# Patient Record
Sex: MALE | Race: WHITE | NOT HISPANIC OR LATINO | Employment: OTHER | URBAN - METROPOLITAN AREA
[De-identification: names, ages, dates, MRNs, and addresses within clinical notes are randomized per-mention and may not be internally consistent; named-entity substitution may affect disease eponyms.]

---

## 2023-09-12 LAB
ALANINE AMINOTRANSFERASE (SGPT) (U/L) IN SER/PLAS: 31 U/L (ref 10–52)
ALBUMIN (G/DL) IN SER/PLAS: 4.4 G/DL (ref 3.4–5)
ALKALINE PHOSPHATASE (U/L) IN SER/PLAS: 56 U/L (ref 33–136)
ANION GAP IN SER/PLAS: 13 MMOL/L (ref 10–20)
ASPARTATE AMINOTRANSFERASE (SGOT) (U/L) IN SER/PLAS: 27 U/L (ref 9–39)
BILIRUBIN TOTAL (MG/DL) IN SER/PLAS: 1.3 MG/DL (ref 0–1.2)
CALCIUM (MG/DL) IN SER/PLAS: 9.6 MG/DL (ref 8.6–10.6)
CARBON DIOXIDE, TOTAL (MMOL/L) IN SER/PLAS: 28 MMOL/L (ref 21–32)
CHLORIDE (MMOL/L) IN SER/PLAS: 107 MMOL/L (ref 98–107)
CHOLESTEROL (MG/DL) IN SER/PLAS: 138 MG/DL (ref 0–199)
CHOLESTEROL IN HDL (MG/DL) IN SER/PLAS: 59.2 MG/DL
CHOLESTEROL/HDL RATIO: 2.3
CREATININE (MG/DL) IN SER/PLAS: 0.75 MG/DL (ref 0.5–1.3)
ERYTHROCYTE DISTRIBUTION WIDTH (RATIO) BY AUTOMATED COUNT: 12.7 % (ref 11.5–14.5)
ERYTHROCYTE MEAN CORPUSCULAR HEMOGLOBIN CONCENTRATION (G/DL) BY AUTOMATED: 33.1 G/DL (ref 32–36)
ERYTHROCYTE MEAN CORPUSCULAR VOLUME (FL) BY AUTOMATED COUNT: 99 FL (ref 80–100)
ERYTHROCYTES (10*6/UL) IN BLOOD BY AUTOMATED COUNT: 4.58 X10E12/L (ref 4.5–5.9)
GFR MALE: >90 ML/MIN/1.73M2
GLUCOSE (MG/DL) IN SER/PLAS: 97 MG/DL (ref 74–99)
HEMATOCRIT (%) IN BLOOD BY AUTOMATED COUNT: 45.3 % (ref 41–52)
HEMOGLOBIN (G/DL) IN BLOOD: 15 G/DL (ref 13.5–17.5)
LDL: 55 MG/DL (ref 0–99)
LEUKOCYTES (10*3/UL) IN BLOOD BY AUTOMATED COUNT: 5.4 X10E9/L (ref 4.4–11.3)
NRBC (PER 100 WBCS) BY AUTOMATED COUNT: 0 /100 WBC (ref 0–0)
PLATELETS (10*3/UL) IN BLOOD AUTOMATED COUNT: 146 X10E9/L (ref 150–450)
POTASSIUM (MMOL/L) IN SER/PLAS: 4.4 MMOL/L (ref 3.5–5.3)
PROSTATE SPECIFIC AG (NG/ML) IN SER/PLAS: 3.16 NG/ML (ref 0–4)
PROTEIN TOTAL: 6.5 G/DL (ref 6.4–8.2)
SODIUM (MMOL/L) IN SER/PLAS: 144 MMOL/L (ref 136–145)
TRIGLYCERIDE (MG/DL) IN SER/PLAS: 118 MG/DL (ref 0–149)
UREA NITROGEN (MG/DL) IN SER/PLAS: 11 MG/DL (ref 6–23)
VLDL: 24 MG/DL (ref 0–40)

## 2023-10-09 PROBLEM — I73.9 CLAUDICATION (CMS-HCC): Status: ACTIVE | Noted: 2023-10-09

## 2023-10-09 PROBLEM — I10 HYPERTENSION: Status: ACTIVE | Noted: 2023-10-09

## 2023-10-09 PROBLEM — R22.31 LUMP OF SKIN OF RIGHT UPPER EXTREMITY: Status: ACTIVE | Noted: 2023-10-09

## 2023-10-09 PROBLEM — K21.9 ESOPHAGEAL REFLUX: Status: ACTIVE | Noted: 2023-10-09

## 2023-10-09 PROBLEM — I73.9 PAD (PERIPHERAL ARTERY DISEASE) (CMS-HCC): Status: ACTIVE | Noted: 2023-10-09

## 2023-10-09 PROBLEM — R00.2 PALPITATIONS: Status: ACTIVE | Noted: 2023-10-09

## 2023-10-09 PROBLEM — N40.0 BPH WITHOUT OBSTRUCTION/LOWER URINARY TRACT SYMPTOMS: Status: ACTIVE | Noted: 2023-10-09

## 2023-10-09 PROBLEM — I25.10 CAD S/P PERCUTANEOUS CORONARY ANGIOPLASTY: Status: ACTIVE | Noted: 2023-10-09

## 2023-10-09 PROBLEM — N52.9 ED (ERECTILE DYSFUNCTION): Status: ACTIVE | Noted: 2023-10-09

## 2023-10-09 PROBLEM — Z98.61 CAD S/P PERCUTANEOUS CORONARY ANGIOPLASTY: Status: ACTIVE | Noted: 2023-10-09

## 2023-10-09 PROBLEM — M10.9 GOUT: Status: ACTIVE | Noted: 2023-10-09

## 2023-10-09 PROBLEM — E78.5 HYPERLIPIDEMIA: Status: ACTIVE | Noted: 2023-10-09

## 2023-10-09 PROBLEM — E66.9 OBESITY: Status: ACTIVE | Noted: 2023-10-09

## 2023-10-09 RX ORDER — NITROGLYCERIN 0.4 MG/1
TABLET SUBLINGUAL
COMMUNITY
Start: 2017-02-28

## 2023-10-09 RX ORDER — SILDENAFIL CITRATE 20 MG/1
TABLET ORAL
COMMUNITY
Start: 2017-09-26

## 2023-10-09 RX ORDER — LISINOPRIL AND HYDROCHLOROTHIAZIDE 12.5; 2 MG/1; MG/1
2 TABLET ORAL DAILY
COMMUNITY
Start: 2018-06-08

## 2023-10-09 RX ORDER — FLUOROURACIL 50 MG/G
CREAM TOPICAL 2 TIMES DAILY
COMMUNITY
Start: 2021-04-02

## 2023-10-09 RX ORDER — ATENOLOL 50 MG/1
1 TABLET ORAL DAILY
COMMUNITY
Start: 2016-02-28 | End: 2024-04-22

## 2023-10-09 RX ORDER — TAMSULOSIN HYDROCHLORIDE 0.4 MG/1
2 CAPSULE ORAL DAILY
COMMUNITY
Start: 2014-03-17 | End: 2024-03-01

## 2023-10-09 RX ORDER — ATORVASTATIN CALCIUM 80 MG/1
1 TABLET, FILM COATED ORAL DAILY
COMMUNITY
Start: 2016-05-31 | End: 2024-01-17

## 2023-10-09 RX ORDER — ASPIRIN 81 MG/1
1 TABLET ORAL DAILY
COMMUNITY
Start: 2017-02-28

## 2023-10-09 RX ORDER — CILOSTAZOL 100 MG/1
1 TABLET ORAL 2 TIMES DAILY
COMMUNITY
Start: 2017-09-26

## 2023-10-09 RX ORDER — OMEPRAZOLE 20 MG/1
CAPSULE, DELAYED RELEASE ORAL
COMMUNITY
Start: 2014-02-19 | End: 2024-03-22

## 2023-10-10 ENCOUNTER — OFFICE VISIT (OUTPATIENT)
Dept: SURGERY | Facility: CLINIC | Age: 78
End: 2023-10-10
Payer: MEDICARE

## 2023-10-10 VITALS
BODY MASS INDEX: 27.31 KG/M2 | HEIGHT: 67 IN | WEIGHT: 174 LBS | OXYGEN SATURATION: 97 % | SYSTOLIC BLOOD PRESSURE: 183 MMHG | HEART RATE: 74 BPM | TEMPERATURE: 97.5 F | DIASTOLIC BLOOD PRESSURE: 90 MMHG

## 2023-10-10 DIAGNOSIS — R19.5 CHANGE IN STOOL CALIBER: Primary | ICD-10-CM

## 2023-10-10 PROCEDURE — 1126F AMNT PAIN NOTED NONE PRSNT: CPT | Performed by: STUDENT IN AN ORGANIZED HEALTH CARE EDUCATION/TRAINING PROGRAM

## 2023-10-10 PROCEDURE — 99204 OFFICE O/P NEW MOD 45 MIN: CPT | Performed by: STUDENT IN AN ORGANIZED HEALTH CARE EDUCATION/TRAINING PROGRAM

## 2023-10-10 PROCEDURE — 99214 OFFICE O/P EST MOD 30 MIN: CPT | Performed by: STUDENT IN AN ORGANIZED HEALTH CARE EDUCATION/TRAINING PROGRAM

## 2023-10-10 PROCEDURE — 3077F SYST BP >= 140 MM HG: CPT | Performed by: STUDENT IN AN ORGANIZED HEALTH CARE EDUCATION/TRAINING PROGRAM

## 2023-10-10 PROCEDURE — 3080F DIAST BP >= 90 MM HG: CPT | Performed by: STUDENT IN AN ORGANIZED HEALTH CARE EDUCATION/TRAINING PROGRAM

## 2023-10-10 ASSESSMENT — PAIN SCALES - GENERAL: PAINLEVEL: 0-NO PAIN

## 2023-10-10 NOTE — PROGRESS NOTES
History Of Present Illness  Jose Melo Jr. is a 78 y.o. male who is presenting with bilateral inguinal hernias, and an umbilical hernia, and concerns about his bowel habits.  Over the past year, he has noticed that his bowel movements have become increasingly more difficult, requiring some straining, and have also been decreasing in caliber, now becoming quite thin.  He denies any hematochezia or melena, nausea, fevers, chills, weight loss.  He also has a longstanding history of known small bilateral inguinal hernias for approximately 10 years.  He also endorses a increasing in size umbilical hernia which is intermittently symptomatic, but does not interfere with his daily activities and does not bother him daily.  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 His smoking use included cigarettes. He does not have any smokeless tobacco history on file. He reports current alcohol use of about 2.0 standard drinks of alcohol per week. He reports that he does not use drugs.    Family History  Family History   Problem Relation Name Age of Onset    Kidney cancer Mother      Other (CABG) Father      Multiple myeloma Father      Diabetes Sister      Heart attack Other         "  Allergies  Penicillins    Review of Systems  - CONSTITUTIONAL: Denies fever and chills.  - HEENT: Denies changes in vision and hearing.  - RESPIRATORY: Denies SOB and cough.  - CV: Denies palpitations and CP.  - GI: Denies abdominal pain, nausea, vomiting and diarrhea.  - : Denies dysuria and urinary frequency.  - MSK: Denies myalgia and joint pain.  - SKIN: Denies rash and pruritus.  - NEUROLOGICAL: Denies headache and syncope.  - PSYCHIATRIC: Denies recent changes in mood. Denies anxiety and depression.     Physical Exam  - GENERAL: Alert and oriented x 3. No acute distress. Well-nourished.  - EYES: EOMI. Anicteric.  - HENT: Moist mucous membranes. No scleral icterus. No cervical lymphadenopathy.  - LUNGS: Breathing comfortably on room air. No accessory muscle use, no distress.  - CARDIOVASCULAR: Regular rate and rhythm. No murmur. No JVD.  - ABDOMEN: Soft, non-tender and non-distended.  Small palpable bilateral inguinal hernias on Valsalva.  Obvious umbilical hernia with an associated approximately 3 cm fascial defect.  - EXTREMITIES: No edema. Non-tender.  - SKIN: No rashes or lesions. Warm.  - NEUROLOGIC: No focal neurological deficits. CN II-XII grossly intact, but not individually tested.  - PSYCHIATRIC: Cooperative. Appropriate mood and affect.     Last Recorded Vitals  Blood pressure (!) 183/90, pulse 74, temperature 36.4 °C (97.5 °F), temperature source Temporal, height 1.702 m (5' 7\"), weight 78.9 kg (174 lb), SpO2 97 %.    Assessment/Plan   Patient is a 78-year-old male who presents with concerns over his bilateral inguinal hernias, umbilical hernia, and change in bowel habits.  I did explain that his small inguinal hernias only present on Valsalva are not affecting his bowel habits.  We also discussed the etiology, pathophysiology, various treatment options, natural course of inguinal hernias and he voiced understanding.  Because they have been not enlarging over the past decade and are completely " asymptomatic at this point, we both agreed that watchful waiting is a reasonable approach at this time.  I did explain that because his umbilical hernia is enlarging and intermittently symptomatic that an open primary repair of his umbilical hernia is indicated.  However, we also discussed the fact that his change in bowel habits certainly warrant a endoscopic evaluation.  Therefore, we will order a diagnostic colonoscopy.  Once this has been completed, he will call my office (number provided), and pending the above results we will schedule a open primary umbilical hernia repair.  We did discuss if a colonic pathology were discovered and surgical intervention is warranted, that we will delay repair of the umbilical hernia, likely to performed concomitantly with the colon resection.  If no pathology is found during colonoscopy, we will then schedule for an open primary umbilical hernia.    Bladimir Denny MD

## 2023-10-17 DIAGNOSIS — Z12.11 SCREENING FOR COLON CANCER: ICD-10-CM

## 2023-10-18 RX ORDER — POLYETHYLENE GLYCOL-3350 AND ELECTROLYTES 236; 6.74; 5.86; 2.97; 22.74 G/274.31G; G/274.31G; G/274.31G; G/274.31G; G/274.31G
POWDER, FOR SOLUTION ORAL
Qty: 4000 ML | Refills: 0 | OUTPATIENT
Start: 2023-10-18 | End: 2023-11-16

## 2023-11-09 ENCOUNTER — PREP FOR PROCEDURE (OUTPATIENT)
Dept: GASTROENTEROLOGY | Facility: HOSPITAL | Age: 78
End: 2023-11-09
Payer: MEDICARE

## 2023-11-16 ENCOUNTER — HOSPITAL ENCOUNTER (OUTPATIENT)
Dept: GASTROENTEROLOGY | Facility: HOSPITAL | Age: 78
Setting detail: OUTPATIENT SURGERY
Discharge: HOME | End: 2023-11-16
Payer: MEDICARE

## 2023-11-16 ENCOUNTER — ANESTHESIA (OUTPATIENT)
Dept: GASTROENTEROLOGY | Facility: HOSPITAL | Age: 78
End: 2023-11-16
Payer: MEDICARE

## 2023-11-16 ENCOUNTER — ANESTHESIA EVENT (OUTPATIENT)
Dept: GASTROENTEROLOGY | Facility: HOSPITAL | Age: 78
End: 2023-11-16
Payer: MEDICARE

## 2023-11-16 VITALS
SYSTOLIC BLOOD PRESSURE: 135 MMHG | BODY MASS INDEX: 27 KG/M2 | WEIGHT: 172 LBS | HEIGHT: 67 IN | RESPIRATION RATE: 15 BRPM | OXYGEN SATURATION: 97 % | DIASTOLIC BLOOD PRESSURE: 73 MMHG | TEMPERATURE: 97.9 F | HEART RATE: 56 BPM

## 2023-11-16 DIAGNOSIS — Z12.11 SCREENING FOR COLON CANCER: Primary | ICD-10-CM

## 2023-11-16 PROBLEM — Z95.5 STENTED CORONARY ARTERY: Status: ACTIVE | Noted: 2023-11-16

## 2023-11-16 PROCEDURE — 99100 ANES PT EXTEME AGE<1 YR&>70: CPT | Performed by: ANESTHESIOLOGY

## 2023-11-16 PROCEDURE — 7100000009 HC PHASE TWO TIME - INITIAL BASE CHARGE

## 2023-11-16 PROCEDURE — A45385 PR COLONOSCOPY,REMV LESN,SNARE: Performed by: ANESTHESIOLOGY

## 2023-11-16 PROCEDURE — 3700000002 HC GENERAL ANESTHESIA TIME - EACH INCREMENTAL 1 MINUTE

## 2023-11-16 PROCEDURE — 88305 TISSUE EXAM BY PATHOLOGIST: CPT | Mod: TC,AHULAB | Performed by: COLON & RECTAL SURGERY

## 2023-11-16 PROCEDURE — 2500000004 HC RX 250 GENERAL PHARMACY W/ HCPCS (ALT 636 FOR OP/ED)

## 2023-11-16 PROCEDURE — 45385 COLONOSCOPY W/LESION REMOVAL: CPT | Performed by: COLON & RECTAL SURGERY

## 2023-11-16 PROCEDURE — 88305 TISSUE EXAM BY PATHOLOGIST: CPT | Mod: TC,SUR,AHULAB | Performed by: COLON & RECTAL SURGERY

## 2023-11-16 PROCEDURE — 88305 TISSUE EXAM BY PATHOLOGIST: CPT | Performed by: PATHOLOGY

## 2023-11-16 PROCEDURE — 3700000001 HC GENERAL ANESTHESIA TIME - INITIAL BASE CHARGE

## 2023-11-16 PROCEDURE — A45385 PR COLONOSCOPY,REMV LESN,SNARE

## 2023-11-16 PROCEDURE — 7100000010 HC PHASE TWO TIME - EACH INCREMENTAL 1 MINUTE

## 2023-11-16 PROCEDURE — 2720000007 HC OR 272 NO HCPCS

## 2023-11-16 RX ORDER — SODIUM CHLORIDE, SODIUM LACTATE, POTASSIUM CHLORIDE, CALCIUM CHLORIDE 600; 310; 30; 20 MG/100ML; MG/100ML; MG/100ML; MG/100ML
20 INJECTION, SOLUTION INTRAVENOUS CONTINUOUS
Status: DISCONTINUED | OUTPATIENT
Start: 2023-11-16 | End: 2023-11-17 | Stop reason: HOSPADM

## 2023-11-16 RX ORDER — PROPOFOL 10 MG/ML
INJECTION, EMULSION INTRAVENOUS CONTINUOUS PRN
Status: DISCONTINUED | OUTPATIENT
Start: 2023-11-16 | End: 2023-11-16

## 2023-11-16 RX ORDER — PROPOFOL 10 MG/ML
INJECTION, EMULSION INTRAVENOUS AS NEEDED
Status: DISCONTINUED | OUTPATIENT
Start: 2023-11-16 | End: 2023-11-16

## 2023-11-16 RX ADMIN — SODIUM CHLORIDE, SODIUM LACTATE, POTASSIUM CHLORIDE, AND CALCIUM CHLORIDE: 600; 310; 30; 20 INJECTION, SOLUTION INTRAVENOUS at 09:53

## 2023-11-16 RX ADMIN — PROPOFOL 50 MG: 10 INJECTION, EMULSION INTRAVENOUS at 09:55

## 2023-11-16 RX ADMIN — PROPOFOL 10 MG: 10 INJECTION, EMULSION INTRAVENOUS at 10:11

## 2023-11-16 RX ADMIN — PROPOFOL 300 MCG/KG/MIN: 10 INJECTION, EMULSION INTRAVENOUS at 09:54

## 2023-11-16 RX ADMIN — PROPOFOL 30 MG: 10 INJECTION, EMULSION INTRAVENOUS at 10:00

## 2023-11-16 RX ADMIN — PROPOFOL 10 MG: 10 INJECTION, EMULSION INTRAVENOUS at 10:05

## 2023-11-16 ASSESSMENT — PAIN SCALES - GENERAL
PAINLEVEL_OUTOF10: 0 - NO PAIN

## 2023-11-16 ASSESSMENT — COLUMBIA-SUICIDE SEVERITY RATING SCALE - C-SSRS
2. HAVE YOU ACTUALLY HAD ANY THOUGHTS OF KILLING YOURSELF?: NO
6. HAVE YOU EVER DONE ANYTHING, STARTED TO DO ANYTHING, OR PREPARED TO DO ANYTHING TO END YOUR LIFE?: NO
1. IN THE PAST MONTH, HAVE YOU WISHED YOU WERE DEAD OR WISHED YOU COULD GO TO SLEEP AND NOT WAKE UP?: NO

## 2023-11-16 ASSESSMENT — PAIN - FUNCTIONAL ASSESSMENT
PAIN_FUNCTIONAL_ASSESSMENT: 0-10

## 2023-11-16 NOTE — POST-PROCEDURE NOTE
1023: Patient arrived to [place] after procedure with Anesthesia and procedure RN, procedure discussed, plan reviewed, VSS  1029: family at bedside  1031: dr perrin at bedside  1036: Discharge instructions discussed with patient and family at this time verbalized understanding   1055: Pt taken off of monitor  1056: Pt ambulated to restroom  1059: Pt dressed with assistance  1103: Pt placed in for transportation, family sent down for vehicle  1104: Pt ambulated to restroom  1107: IV removed, pt tolerated well  1113: Patient Discharged in stable condition via wheelchair to [facility]

## 2023-11-16 NOTE — H&P
"History Of Present Illness  Jose Melo Jr. is a 78 y.o. male presenting with change in the caliper of stools for past 6 months.     Past Medical History  Past Medical History:   Diagnosis Date    Aortic stenosis, mild 09/2023    BPH (benign prostatic hyperplasia)     CAD S/P percutaneous coronary angioplasty     Hiatal hernia     HTN (hypertension)     Other pneumothorax     Spontaneous pneumothorax many years ago x2    PAD (peripheral artery disease) (CMS/HCC)     Unspecified viral hepatitis B without hepatic coma     Viral hepatitis B       Surgical History  Past Surgical History:   Procedure Laterality Date    COLONOSCOPY      CT AORTA AND BILATERAL ILIOFEMORAL RUNOFF ANGIOGRAM W AND/OR WO IV CONTRAST  09/19/2017    CT AORTA AND BILATERAL ILIOFEMORAL RUNOFF ANGIOGRAM W AND/OR WO IV CONTRAST 9/19/2017 [place] ANCILLARY LEGACY        Social History  He reports that he has quit smoking. His smoking use included cigarettes. He has never used smokeless tobacco. He reports current alcohol use of about 2.0 standard drinks of alcohol per week. He reports that he does not use drugs.    Family History  Family History   Problem Relation Name Age of Onset    Kidney cancer Mother      Other (CABG) Father      Multiple myeloma Father      Diabetes Sister      Heart attack Other          Allergies  Penicillins    Review of Systems     Physical Exam   Constitutional: Well developed, awake/alert/oriented x3, no distress, alert and cooperative   CV:  RRR  Lungs:  No audible wheezing, no tachypnea, chest symmetric, no increased WOB  Gastrointestinal: soft,  nontender  Neurological: alert and oriented     Last Recorded Vitals  Blood pressure 164/71, pulse 71, temperature 36.4 °C (97.5 °F), temperature source Temporal, resp. rate 12, height 1.702 m (5' 7\"), weight 78 kg (172 lb), SpO2 98 %.       Assessment/Plan   Active Problems:  There are no active Hospital Problems.      Colonoscopy    I spent 5 minutes in the professional and " overall care of this patient.      Ami Bowling MD

## 2023-11-16 NOTE — ANESTHESIA POSTPROCEDURE EVALUATION
Patient: Jose Melo Jr.    Procedure Summary       Date: 11/16/23 Room / Location: [facility] [place]    Anesthesia Start: 0952 Anesthesia Stop: 1029    Procedure: COLONOSCOPY Diagnosis:       Screening for colon cancer      Screening for colon cancer    Scheduled Providers: Ami Bowling MD; Dave Guzmán MD; EDUIN Raymond; Bronson Duran RN; Christy Malcolm RN Responsible Provider: Dave Guzmán MD    Anesthesia Type: MAC ASA Status: 3            Anesthesia Type: MAC    Vitals Value Taken Time   /73 11/16/23 1053   Temp 36.6 °C (97.9 °F) 11/16/23 1023   Pulse 56 11/16/23 1053   Resp 15 11/16/23 1053   SpO2 97 % 11/16/23 1053       Anesthesia Post Evaluation    Patient location during evaluation: bedside  Patient participation: complete - patient participated  Level of consciousness: awake and alert  Pain management: satisfactory to patient  Airway patency: patent  Cardiovascular status: acceptable  Respiratory status: acceptable  Hydration status: acceptable  Postoperative Nausea and Vomiting: none  Comments: No apparent complications.        No notable events documented.

## 2023-11-16 NOTE — LETTER
Ami Bowling MD   [facility]   [address] [address]   [phone]        Dear Marck Lorie,       It was my pleasure to see you at your recent colonoscopy.  At that time,  you were noted to have 7 polyps in the cecum, ascending colon, transverse colon, and colon.  The polyps were completely excised and pathology returned the adenomatous type.  Which are a precancerous type of polyp if not removed, but yours were completely resected.  The current recommendation is to repeat the colonoscopy in 1 year.       Thank you very much for allowing me to take part in your care, please feel free to contact me with any questions or concerns at [phone].          Sincerely,       Ami Bowling M.D. FACS, SARAY    CC:  Primary Care:

## 2023-11-16 NOTE — ANESTHESIA PREPROCEDURE EVALUATION
Patient: Jose Melo Jr.    Procedure Information       Date/Time: 11/16/23 1000    Scheduled providers: Ami Bowling MD; Dave Guzmán MD; EDUIN Raymond    Procedure: COLONOSCOPY    Location: [facility]            Relevant Problems   Anesthesia   No history of complications History of anesthesia complications      Cardiovascular   (+) CAD S/P percutaneous coronary angioplasty   (+) Hyperlipidemia   (+) Hypertension   (+) PAD (peripheral artery disease) (CMS/HCC)   (+) Stented coronary artery      Endocrine   (+) Obesity      GI   (+) Esophageal reflux       Clinical information reviewed:   Tobacco  Allergies  Meds   Med Hx  Surg Hx   Fam Hx  Soc Hx        NPO/Void Status  Carbonhydrate Drink Given Prior to Surgery? 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 [place] ANCILLARY LEGACY     Social History     Tobacco Use    Smoking status: Former     Types: Cigarettes    Smokeless tobacco: Never   Substance Use Topics    Alcohol use: Yes     Alcohol/week: 2.0 standard drinks of alcohol     Types: 2 Cans of beer per week    Drug use: Never      Current Outpatient Medications   Medication Instructions    aspirin 81 mg EC tablet 1 tablet, oral, Daily    atenolol  "(Tenormin) 50 mg tablet 1 tablet, oral, Daily    atorvastatin (Lipitor) 80 mg tablet 1 tablet, oral, Daily    cilostazol (Pletal) 100 mg tablet 1 tablet, oral, 2 times daily    fluorouracil (Efudex) 5 % cream Topical, 2 times daily    lisinopriL-hydrochlorothiazide 20-12.5 mg tablet 2 tablets, oral, Daily    nitroglycerin (Nitrostat) 0.4 mg SL tablet sublingual    omeprazole (PriLOSEC) 20 mg DR capsule oral, Daily RT    polyethylene glycol-electrolytes (GaviLyte-G) 420 gram solution Please refer to the printed instructions that were mailed to you.    sildenafil (Revatio) 20 mg tablet oral    tamsulosin (Flomax) 0.4 mg 24 hr capsule 2 capsules, oral, Daily      Allergies   Allergen Reactions    Penicillins Dizziness and Other        Chemistry    Lab Results   Component Value Date/Time     09/12/2023 1252    K 4.4 09/12/2023 1252     09/12/2023 1252    CO2 28 09/12/2023 1252    BUN 11 09/12/2023 1252    CREATININE 0.75 09/12/2023 1252    Lab Results   Component Value Date/Time    CALCIUM 9.6 09/12/2023 1252    ALKPHOS 56 09/12/2023 1252    AST 27 09/12/2023 1252    ALT 31 09/12/2023 1252    BILITOT 1.3 (H) 09/12/2023 1252          Lab Results   Component Value Date/Time    WBC 5.4 09/12/2023 1252    HGB 15.0 09/12/2023 1252    HCT 45.3 09/12/2023 1252     (L) 09/12/2023 1252     No results found for: \"PROTIME\", \"PTT\", \"INR\"  No results found for this or any previous visit (from the past 4464 hour(s)).  No results found for this or any previous visit from the past 1095 days.  Echo 9/12/2023:   Left Ventricle: The left ventricular systolic function is normal, with an estimated ejection fraction of 60%. There are no regional wall motion abnormalities. The left ventricular cavity size is normal. Doppler shows a borderline pattern of left ventricular diastolic filling.  Left Atrium: The left atrium is normal in size.  Right Ventricle: The right ventricle is normal in size. There is normal right " "ventricular global systolic function.  Right Atrium: The right atrium is normal in size.  Aortic Valve: The aortic valve is probably trileaflet. There is mild to moderate aortic valve cusp calcification. There is reduced aortic valve non coronary cusp excursion. There is evidence of mild aortic valve stenosis.  There is trace to mild aortic valve regurgitation. The peak instantaneous gradient of the aortic valve is 16.0 mmHg.  Mitral Valve: The mitral valve is normal in structure. There is mild mitral annular calcification. There is trace to mild mitral valve regurgitation.  Tricuspid Valve: The tricuspid valve is structurally normal. There is trace to mild tricuspid regurgitation.  Pulmonic Valve: The pulmonic valve is structurally normal. There is mild pulmonic valve regurgitation.  Pericardium: There is a trivial pericardial effusion.  Aorta: The aortic root is normal. There is mild dilatation of the ascending aorta. The aortic root is at the upper limits of normal size.  Pulmonary Artery: The tricuspid regurgitant velocity is 2.31 m/s, and with an estimated right atrial pressure of 5 mmHg, the estimated pulmonary artery pressure is normal with the RVSP at 26.3 mmHg.  Systemic Veins: The inferior vena cava appears to be of upper normal size (not well visualized).  In comparison to the previous echocardiogram(s): There are no prior studies on this patient for comparison purposes.        CONCLUSIONS:  1. Left ventricular systolic function is normal with a 60% estimated ejection fraction.  2. Mild aortic valve stenosis.    Visit Vitals  /71   Pulse 71   Temp 36.4 °C (97.5 °F) (Temporal)   Resp 12   Ht 1.702 m (5' 7\")   Wt 78 kg (172 lb)   SpO2 98%   BMI 26.94 kg/m²   Smoking Status Former   BSA 1.92 m²        Physical Exam    Airway  Mallampati: III  TM distance: >3 FB  Neck ROM: full     Cardiovascular   Rhythm: regular  Rate: normal     Dental - normal exam     Pulmonary   Breath sounds clear to " auscultation     Abdominal - normal exam              Anesthesia Plan    ASA 3     MAC     intravenous induction   Anesthetic plan and risks discussed with patient.

## 2023-11-16 NOTE — DISCHARGE INSTRUCTIONS
Patient Instructions after a Colonoscopy      The anesthetics, sedatives or narcotics which were given to you today will be acting in your body for the next 24 hours, so you might feel a little sleepy or groggy.  This feeling should slowly wear off. Carefully read and follow the instructions.     You received sedation today:  - Do not drive or operate any machinery or power tools of any kind.   - No alcoholic beverages today, not even beer or wine.  - Do not make any important decisions or sign any legal documents.  - No over the counter medications that contain alcohol or that may cause drowsiness.  - Do not make any important decisions or sign any legal documents.    While it is common to experience mild to moderate abdominal distention, gas, or belching after your procedure, if any of these symptoms occur following discharge from the GI Lab or within one week of having your procedure, call the [facility] to be advised whether a visit to your nearest Urgent Care or Emergency Department is indicated.  Take this paper with you if you go.     - If you develop an allergic reaction to the medications that were given during your procedure such as difficulty breathing, rash, hives, severe nausea, vomiting or lightheadedness.  - If you experience chest pain, shortness of breath, severe abdominal pain, fevers and chills.  -If you develop signs and symptoms of bleeding such as blood in your spit, if your stools turn black, tarry, or bloody  - If you have not urinated within 8 hours following your procedure.  - If your IV site becomes painful, red, inflamed, or looks infected.    If you received a biopsy/polypectomy/sphincterotomy the following instructions apply below:    __ Do not use Aspirin containing products, non-steroidal medications or anti-coagulants for one week following your procedure. (Examples of these types of medications are: Advil, Arthrotec, Aleve, Coumadin, Ecotrin, Heparin, Ibuprofen,  Indocin, Motrin, Naprosyn, Nuprin, Plavix, Vioxx, and Voltarin, or their generic forms.  This list is not all-inclusive.  Check with your physician or pharmacist before resuming medications.)   __ Eat a soft diet today.  Avoid foods that are poorly digested for the next 24 hours.  These foods would include: nuts, beans, lettuce, red meats, and fried foods. Start with liquids and advance your diet as tolerated, gradually work up to eating solids.   __ Do not have a Barium Study or Enema for one week.    Your physician recommends the additional following instructions:    -You have a contact number available for emergencies. The signs and symptoms of potential delayed complications were discussed with you. You may return to normal activities tomorrow.  -Resume your previous diet.  -Continue your present medications.   -We are waiting for your pathology results.  -Your physician has recommended a repeat colonoscopy (date to be determined after pending pathology results are reviewed) for surveillance based on pathology results.  -The findings and recommendations have been discussed with you.  -The findings and recommendations were discussed with your family.  - Please see Medication Reconciliation Form for new medication/medications prescribed.       If you experience any problems or have any questions following discharge from the GI Lab, please call:        Nurse Signature                                                                        Date___________________                                                                            Patient/Responsible Party Signature                                        Date___________________

## 2023-11-17 ASSESSMENT — PAIN SCALES - GENERAL: PAINLEVEL_OUTOF10: 0 - NO PAIN

## 2023-11-28 LAB
LABORATORY COMMENT REPORT: NORMAL
PATH REPORT.FINAL DX SPEC: NORMAL
PATH REPORT.GROSS SPEC: NORMAL
PATH REPORT.TOTAL CANCER: NORMAL

## 2024-01-02 NOTE — ADDENDUM NOTE
Encounter addended by: Ami Bowling MD on: 1/2/2024 12:05 PM   Actions taken: Results reviewed in IB, Image edited, Letter saved

## 2024-03-07 ENCOUNTER — LAB (OUTPATIENT)
Dept: LAB | Facility: LAB | Age: 79
End: 2024-03-07
Payer: MEDICARE

## 2024-03-07 DIAGNOSIS — R06.09 DOE (DYSPNEA ON EXERTION): ICD-10-CM

## 2024-03-07 LAB
ALBUMIN SERPL-MCNC: 4.4 G/DL (ref 3.5–5)
ALP BLD-CCNC: 58 U/L (ref 35–125)
ALT SERPL-CCNC: 34 U/L (ref 5–40)
ANION GAP SERPL CALC-SCNC: 12 MMOL/L
AST SERPL-CCNC: 28 U/L (ref 5–40)
BILIRUB SERPL-MCNC: 0.9 MG/DL (ref 0.1–1.2)
BNP SERPL-MCNC: 41 PG/ML (ref 0–99)
BUN SERPL-MCNC: 10 MG/DL (ref 8–25)
CALCIUM SERPL-MCNC: 9.3 MG/DL (ref 8.5–10.4)
CHLORIDE SERPL-SCNC: 106 MMOL/L (ref 97–107)
CO2 SERPL-SCNC: 27 MMOL/L (ref 24–31)
CREAT SERPL-MCNC: 0.8 MG/DL (ref 0.4–1.6)
EGFRCR SERPLBLD CKD-EPI 2021: >90 ML/MIN/1.73M*2
ERYTHROCYTE [DISTWIDTH] IN BLOOD BY AUTOMATED COUNT: 13 % (ref 11.5–14.5)
GLUCOSE SERPL-MCNC: 101 MG/DL (ref 65–99)
HCT VFR BLD AUTO: 43.6 % (ref 41–52)
HGB BLD-MCNC: 14.5 G/DL (ref 13.5–17.5)
MAGNESIUM SERPL-MCNC: 1.9 MG/DL (ref 1.6–3.1)
MCH RBC QN AUTO: 32.4 PG (ref 26–34)
MCHC RBC AUTO-ENTMCNC: 33.3 G/DL (ref 32–36)
MCV RBC AUTO: 97 FL (ref 80–100)
NRBC BLD-RTO: 0 /100 WBCS (ref 0–0)
PLATELET # BLD AUTO: 150 X10*3/UL (ref 150–450)
POTASSIUM SERPL-SCNC: 4.1 MMOL/L (ref 3.4–5.1)
PROT SERPL-MCNC: 6.4 G/DL (ref 5.9–7.9)
RBC # BLD AUTO: 4.48 X10*6/UL (ref 4.5–5.9)
SODIUM SERPL-SCNC: 145 MMOL/L (ref 133–145)
TSH SERPL DL<=0.05 MIU/L-ACNC: 1.85 MIU/L (ref 0.27–4.2)
WBC # BLD AUTO: 4.9 X10*3/UL (ref 4.4–11.3)

## 2024-03-07 PROCEDURE — 36415 COLL VENOUS BLD VENIPUNCTURE: CPT

## 2024-03-07 PROCEDURE — 80053 COMPREHEN METABOLIC PANEL: CPT

## 2024-03-07 PROCEDURE — 84443 ASSAY THYROID STIM HORMONE: CPT

## 2024-03-07 PROCEDURE — 85027 COMPLETE CBC AUTOMATED: CPT

## 2024-03-07 PROCEDURE — 83735 ASSAY OF MAGNESIUM: CPT

## 2024-03-07 PROCEDURE — 83880 ASSAY OF NATRIURETIC PEPTIDE: CPT

## 2024-03-08 DIAGNOSIS — R00.2 PALPITATIONS: ICD-10-CM

## 2024-03-08 DIAGNOSIS — I73.9 CLAUDICATION (CMS-HCC): Primary | ICD-10-CM

## 2024-03-12 ENCOUNTER — HOSPITAL ENCOUNTER (OUTPATIENT)
Dept: CARDIOLOGY | Facility: HOSPITAL | Age: 79
Discharge: HOME | End: 2024-03-12
Payer: MEDICARE

## 2024-03-12 ENCOUNTER — ANCILLARY PROCEDURE (OUTPATIENT)
Dept: VASCULAR MEDICINE | Facility: CLINIC | Age: 79
End: 2024-03-12
Payer: MEDICARE

## 2024-03-12 DIAGNOSIS — I70.213 ATHEROSCLEROSIS OF NATIVE ARTERIES OF EXTREMITIES WITH INTERMITTENT CLAUDICATION, BILATERAL LEGS (CMS-HCC): ICD-10-CM

## 2024-03-12 DIAGNOSIS — I73.9 CLAUDICATION (CMS-HCC): ICD-10-CM

## 2024-03-12 DIAGNOSIS — R00.2 PALPITATIONS: ICD-10-CM

## 2024-03-12 PROCEDURE — 93922 UPR/L XTREMITY ART 2 LEVELS: CPT

## 2024-03-12 PROCEDURE — 93242 EXT ECG>48HR<7D RECORDING: CPT

## 2024-03-12 PROCEDURE — 93922 UPR/L XTREMITY ART 2 LEVELS: CPT | Performed by: SURGERY

## 2024-08-12 ENCOUNTER — TELEPHONE (OUTPATIENT)
Dept: SURGERY | Facility: CLINIC | Age: 79
End: 2024-08-12
Payer: MEDICARE

## 2024-08-12 DIAGNOSIS — Z12.11 SCREENING FOR COLON CANCER: ICD-10-CM

## 2024-08-12 DIAGNOSIS — K63.5 POLYP OF COLON, UNSPECIFIED PART OF COLON, UNSPECIFIED TYPE: ICD-10-CM

## 2024-08-12 RX ORDER — POLYETHYLENE GLYCOL 3350, SODIUM SULFATE ANHYDROUS, SODIUM BICARBONATE, SODIUM CHLORIDE, POTASSIUM CHLORIDE 236; 22.74; 6.74; 5.86; 2.97 G/4L; G/4L; G/4L; G/4L; G/4L
POWDER, FOR SOLUTION ORAL
Qty: 4000 ML | Refills: 0 | Status: SHIPPED | OUTPATIENT
Start: 2024-08-12

## 2024-08-12 NOTE — TELEPHONE ENCOUNTER
Attempted to reach patient to return his call.  He is asking why  he needs to have a colonoscopy.  Message left inviting him to reach me at [phone].  When he returns the call will read him the letter from Dr. Bowling with her recommendations for follow up.  Adia Quintero RN

## 2024-09-17 ENCOUNTER — OFFICE VISIT (OUTPATIENT)
Dept: CARDIOLOGY | Facility: CLINIC | Age: 79
End: 2024-09-17
Payer: MEDICARE

## 2024-09-17 VITALS
OXYGEN SATURATION: 98 % | BODY MASS INDEX: 27.36 KG/M2 | HEART RATE: 67 BPM | WEIGHT: 174.31 LBS | HEIGHT: 67 IN | DIASTOLIC BLOOD PRESSURE: 77 MMHG | SYSTOLIC BLOOD PRESSURE: 163 MMHG

## 2024-09-17 DIAGNOSIS — I10 PRIMARY HYPERTENSION: ICD-10-CM

## 2024-09-17 DIAGNOSIS — I25.10 CAD S/P PERCUTANEOUS CORONARY ANGIOPLASTY: Primary | ICD-10-CM

## 2024-09-17 DIAGNOSIS — E78.2 MIXED HYPERLIPIDEMIA: ICD-10-CM

## 2024-09-17 DIAGNOSIS — Z98.61 CAD S/P PERCUTANEOUS CORONARY ANGIOPLASTY: Primary | ICD-10-CM

## 2024-09-17 PROBLEM — R00.2 PALPITATIONS: Status: RESOLVED | Noted: 2023-10-09 | Resolved: 2024-09-17

## 2024-09-17 PROBLEM — I73.9 CLAUDICATION (CMS-HCC): Status: RESOLVED | Noted: 2023-10-09 | Resolved: 2024-09-17

## 2024-09-17 PROBLEM — Z95.5 STENTED CORONARY ARTERY: Status: RESOLVED | Noted: 2023-11-16 | Resolved: 2024-09-17

## 2024-09-17 PROCEDURE — 1159F MED LIST DOCD IN RCRD: CPT | Performed by: INTERNAL MEDICINE

## 2024-09-17 PROCEDURE — 99214 OFFICE O/P EST MOD 30 MIN: CPT | Performed by: INTERNAL MEDICINE

## 2024-09-17 PROCEDURE — 99214 OFFICE O/P EST MOD 30 MIN: CPT | Mod: GC | Performed by: INTERNAL MEDICINE

## 2024-09-17 PROCEDURE — 1036F TOBACCO NON-USER: CPT | Performed by: INTERNAL MEDICINE

## 2024-09-17 PROCEDURE — 1126F AMNT PAIN NOTED NONE PRSNT: CPT | Performed by: INTERNAL MEDICINE

## 2024-09-17 PROCEDURE — 3077F SYST BP >= 140 MM HG: CPT | Performed by: INTERNAL MEDICINE

## 2024-09-17 PROCEDURE — 1157F ADVNC CARE PLAN IN RCRD: CPT | Performed by: INTERNAL MEDICINE

## 2024-09-17 PROCEDURE — 3078F DIAST BP <80 MM HG: CPT | Performed by: INTERNAL MEDICINE

## 2024-09-17 ASSESSMENT — ENCOUNTER SYMPTOMS
OCCASIONAL FEELINGS OF UNSTEADINESS: 0
LOSS OF SENSATION IN FEET: 0
DEPRESSION: 0

## 2024-09-17 ASSESSMENT — PATIENT HEALTH QUESTIONNAIRE - PHQ9
2. FEELING DOWN, DEPRESSED OR HOPELESS: NOT AT ALL
SUM OF ALL RESPONSES TO PHQ9 QUESTIONS 1 AND 2: 0
1. LITTLE INTEREST OR PLEASURE IN DOING THINGS: NOT AT ALL

## 2024-09-17 ASSESSMENT — COLUMBIA-SUICIDE SEVERITY RATING SCALE - C-SSRS
6. HAVE YOU EVER DONE ANYTHING, STARTED TO DO ANYTHING, OR PREPARED TO DO ANYTHING TO END YOUR LIFE?: NO
1. IN THE PAST MONTH, HAVE YOU WISHED YOU WERE DEAD OR WISHED YOU COULD GO TO SLEEP AND NOT WAKE UP?: NO
2. HAVE YOU ACTUALLY HAD ANY THOUGHTS OF KILLING YOURSELF?: NO

## 2024-09-17 ASSESSMENT — PAIN SCALES - GENERAL: PAINLEVEL: 0-NO PAIN

## 2024-09-17 NOTE — PROGRESS NOTES
Subjective   Patient ID: Jose Melo Jr. is a 78 y.o. male who presents for Follow-up, Palpitations, Hyperlipidemia, and Hypertension.    HPI  77 yo WM w/ h/o CAD s/p LAD stent '06 ([facility]), s/p LAD stent '07 ([facility]), PAD, HTN, HPL, BPH, gout now here for cardiology f/u.     Last seen 9/2023. Claudication had resolved and he had no RICKS despite being very active (walking 12-20k steps/day). BP high in clinic but normal at home. Had issues w LE edema on amlo in the past.    Today reports doing well without issues. Denies CP, and SOB and palpitations. Has chronic BLE swelling that is stable. Remains as active as before logging between 10 and 20k steps per day. Denies palpations. Brought his BP cuffs with him (an arm and a wrist cuff) as his SBPs have been 110s-130s at home however in clinic they are always elevated. Pt measured BP in office with his own cuff which was 184/80 w arm cuff and 179/93 with the wrist cuff.  He reports his SBP this AM was 112. He takes lisionpril-HCTZ one tablet once daily in the morning. Has not used SL nitro in several years. No longer uses Viagra. ROS otherwise negative.    Cardiac Testing:  ECG 2/10: SR (64), RBBB   ECG 9/14: SB (53), RBBB, LAFB, LVH  ECG 12/16: SB (47), RBBB, LAFB  ECG 9/23: EAR (62), RBBB, LAFB  Zio 10/16: SR, HR  (avg 59), SVTx2 (long 7b)  Holter 3/24 predominantly sinus, HR range , avg 70, rare PVCs/PACs, 4 episodes SVT lasting 22 seconds, pt sx correlate w sinus  Echo 9/23: EF 60%, mild AS  URMILA 2/10: no ischemia, EF 60% -> 70%  Cath 10/07: LM 15%, mLAD 95% ([facility]), LCx mild dz, RCA nl  GREER 9/17: R DP 1.03 PT 0.54, L DP 0.99 PT 0.96   GREER 3/24 R PT 0.70, DP 1.65 L PT 1.00, L DP 1.01    Objective   Visit Vitals  /77 (BP Location: Left arm, Patient Position: Sitting, BP Cuff Size: Adult)   Pulse 67      Physical Exam  GEN: NAD, pleasant  NEURO: nonfocal, AAOx3  CV: RRR, S1/S2, 1/6 SARATH best heart at RUSB, no JVD, good pulse volume  PULM: Lungs  CTAB, no wrr, no increased wob on RA  GI: soft nt/nd, +BS  EXT: 2+ LE edema      Current Outpatient Medications on File Prior to Visit   Medication Sig Dispense Refill    aspirin 81 mg EC tablet Take 1 tablet (81 mg) by mouth once daily.      atenolol (Tenormin) 50 mg tablet TAKE 1 TABLET DAILY 90 tablet 3    atorvastatin (Lipitor) 80 mg tablet TAKE 1 TABLET DAILY 90 tablet 3    cilostazol (Pletal) 100 mg tablet Take 1 tablet (100 mg) by mouth 2 times a day.      lisinopriL-hydrochlorothiazide 20-12.5 mg tablet Take 2 tablets by mouth once daily.      nitroglycerin (Nitrostat) 0.4 mg SL tablet Place under the tongue.      omeprazole (PriLOSEC) 20 mg DR capsule TAKE 1 CAPSULE DAILY EVERY MORNING BEFORE BREAKFAST 90 capsule 3    polyethylene glycol (GaviLyte-G) 236-22.74-6.74 -5.86 gram solution Please refer to the printed instructions that were mailed to you. 4000 mL 0    tamsulosin (Flomax) 0.4 mg 24 hr capsule TAKE 2 CAPSULES DAILY 180 capsule 3    [DISCONTINUED] fluorouracil (Efudex) 5 % cream Apply topically 2 times a day.      [DISCONTINUED] sildenafil (Revatio) 20 mg tablet Take by mouth.       No current facility-administered medications on file prior to visit.      Results/Data:  3/24 Cr 0.8, K 4.1, LFT nl, MG 1.9, HGB 14.5, , BNP 41  9/23 LDL 55, HDL 59, , Chol 138  3/24 K 4.1 Cr 0.8, LFT nl, LDL 55 HDL 59, , Chol 138, BNP 41, Hgb 14.5,   3/22 Cr 0.67, K 3.6, LFT nl, LDL 59, HDL 59, , Chol 148, HGB 15.8,   6/21 Cr 0.81, K 3.9  9/20 Cr 0.73, K 4.5, LFT nl, LDL 59, HDL 64, TG 78, Chol 138, HGB 16.5, , TSH 1.1, CRP 0.4  3/19 Cr 0.78, K 4.9, LFT nl, LDL 88, HDL 51, TG 85, Chol 156, HGB 16.3,  (Atorva 40)  9/17 Cr 0.82, K 4.3, LFT nl, LDL 86, HDL 52, , Chol 169, HGB 16.4,   12/16 Cr 0.82, K 4.2, ALT 31, , HDL 54, , Chol 180, HGB 15.7,   8/16 Cr 1.04, K 4.6, ALT 27, LDL 75, HDL 48, , Chol 159  2/16 Cr 0.7, K 3.8, ALT/AST  nl, LDL 91, HDL 42, , Chol 172  10/15 LDL 86, HDL 44, , Chol 151     Assessment/Plan   79 yo WM w/ h/o CAD s/p LAD stent '06 ([facility]), s/p LAD stent '07 ([facility]), PAD, HTN, HPL, BPH, gout.     Doing well. BP high today; however, BP usually better at home. Home BP cuff correlated with clinic cuff today - c/w white-coat HTN. A few episodes of SVT on holter 3/2024 but not symptomatic during that time. Currently denies palpitations. Cont BB.    -continue ASA 81 qd  -continue Atenolol 50 qd  -continue Lisinopril-HCTZ 20-12.5 every day in the morning  -continue Cilostazol 100 every day   -continue Atorva 80 qhs -> goal LDL <70  -No longer taking Viagra, (re-instructed on fatal risk of taking any nitro within 24 hours of Viagra; and hold Tamsulosin on days he takes Viagra)  -Has not used SL nitro in several years  -continue Tamsulosin 0.8 qd    -f/u 1 year (earlier if needed)       Bimal Jean Baptiste MD 09/17/24 10:31 AM     I saw and evaluated the patient. I personally obtained the key and critical portions of the history and physical exam or was physically present for key and critical portions performed by the resident/fellow. I reviewed the resident/fellow's documentation and discussed the patient with the resident/fellow. I agree with the resident/fellow's medical decision making as documented in the note.    Vernon Iglesias MD

## 2024-09-17 NOTE — PATIENT INSTRUCTIONS
It was a pleasure seeing you today!     Your BP cuffs correlated well with our office cuffs. Please continue to measure your BP periodically at home.    Please continue to take all your medications as prescribed.    Viagra has been removed from your meds list. If in the future you decide to take this please be sure you do not also take nitroglycerin tablets within 24 hrs as there is a potentially fatal reaction between the two medications if taken together.    Please come back and see us in 1 year.

## 2024-11-07 ENCOUNTER — ANESTHESIA (OUTPATIENT)
Dept: GASTROENTEROLOGY | Facility: HOSPITAL | Age: 79
End: 2024-11-07
Payer: MEDICARE

## 2024-11-07 ENCOUNTER — HOSPITAL ENCOUNTER (OUTPATIENT)
Dept: GASTROENTEROLOGY | Facility: HOSPITAL | Age: 79
Discharge: HOME | End: 2024-11-07
Payer: MEDICARE

## 2024-11-07 ENCOUNTER — ANESTHESIA EVENT (OUTPATIENT)
Dept: GASTROENTEROLOGY | Facility: HOSPITAL | Age: 79
End: 2024-11-07
Payer: MEDICARE

## 2024-11-07 VITALS
BODY MASS INDEX: 27.47 KG/M2 | HEIGHT: 67 IN | SYSTOLIC BLOOD PRESSURE: 135 MMHG | HEART RATE: 56 BPM | TEMPERATURE: 97.9 F | DIASTOLIC BLOOD PRESSURE: 67 MMHG | RESPIRATION RATE: 16 BRPM | OXYGEN SATURATION: 99 % | WEIGHT: 175.04 LBS

## 2024-11-07 DIAGNOSIS — K63.5 POLYP OF COLON, UNSPECIFIED PART OF COLON, UNSPECIFIED TYPE: Primary | ICD-10-CM

## 2024-11-07 DIAGNOSIS — Z12.11 SCREENING FOR COLON CANCER: ICD-10-CM

## 2024-11-07 PROCEDURE — 2500000004 HC RX 250 GENERAL PHARMACY W/ HCPCS (ALT 636 FOR OP/ED): Performed by: NURSE ANESTHETIST, CERTIFIED REGISTERED

## 2024-11-07 PROCEDURE — 45385 COLONOSCOPY W/LESION REMOVAL: CPT | Performed by: COLON & RECTAL SURGERY

## 2024-11-07 PROCEDURE — 3700000001 HC GENERAL ANESTHESIA TIME - INITIAL BASE CHARGE

## 2024-11-07 PROCEDURE — 7100000010 HC PHASE TWO TIME - EACH INCREMENTAL 1 MINUTE

## 2024-11-07 PROCEDURE — 7100000009 HC PHASE TWO TIME - INITIAL BASE CHARGE

## 2024-11-07 PROCEDURE — 3700000002 HC GENERAL ANESTHESIA TIME - EACH INCREMENTAL 1 MINUTE

## 2024-11-07 RX ORDER — LIDOCAINE HYDROCHLORIDE 10 MG/ML
0.1 INJECTION, SOLUTION EPIDURAL; INFILTRATION; INTRACAUDAL; PERINEURAL ONCE
Status: DISCONTINUED | OUTPATIENT
Start: 2024-11-07 | End: 2024-11-08 | Stop reason: HOSPADM

## 2024-11-07 RX ORDER — PROPOFOL 10 MG/ML
INJECTION, EMULSION INTRAVENOUS AS NEEDED
Status: DISCONTINUED | OUTPATIENT
Start: 2024-11-07 | End: 2024-11-07

## 2024-11-07 RX ORDER — PHENYLEPHRINE HCL IN 0.9% NACL 1 MG/10 ML
SYRINGE (ML) INTRAVENOUS AS NEEDED
Status: DISCONTINUED | OUTPATIENT
Start: 2024-11-07 | End: 2024-11-07

## 2024-11-07 RX ORDER — SODIUM CHLORIDE, SODIUM LACTATE, POTASSIUM CHLORIDE, CALCIUM CHLORIDE 600; 310; 30; 20 MG/100ML; MG/100ML; MG/100ML; MG/100ML
100 INJECTION, SOLUTION INTRAVENOUS CONTINUOUS
Status: DISCONTINUED | OUTPATIENT
Start: 2024-11-07 | End: 2024-11-08 | Stop reason: HOSPADM

## 2024-11-07 ASSESSMENT — ENCOUNTER SYMPTOMS
LIGHT-HEADEDNESS: 0
ABDOMINAL PAIN: 0
VOMITING: 0
DIARRHEA: 0
JOINT SWELLING: 0
AGITATION: 0
TROUBLE SWALLOWING: 0
HEMATURIA: 0
NUMBNESS: 0
DYSURIA: 0
DIFFICULTY URINATING: 0
FEVER: 0
NAUSEA: 0
SHORTNESS OF BREATH: 0
ARTHRALGIAS: 0
CHILLS: 0
DIZZINESS: 0
COLOR CHANGE: 0

## 2024-11-07 ASSESSMENT — PAIN SCALES - GENERAL
PAINLEVEL_OUTOF10: 0 - NO PAIN

## 2024-11-07 ASSESSMENT — PAIN - FUNCTIONAL ASSESSMENT
PAIN_FUNCTIONAL_ASSESSMENT: 0-10

## 2024-11-07 NOTE — H&P
History Of Present Illness  Jose Melo Jr. is a 79 y.o. male presenting with a personal history of polyps. Last colonoscopy on 11/16/2023 which demonstrated polyps in the cecum, ascending colon, transverse colon and descending colon. He presents today for planned repeat colonoscopy 1 year after.     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 His smoking use included cigarettes. He has never used smokeless tobacco. He reports current alcohol use of about 20.0 standard drinks of alcohol per week. He reports that he does not use drugs.    Family History  Family History   Problem Relation Name Age of Onset    Kidney cancer Mother      Other (CABG) Father      Multiple myeloma Father      Diabetes Sister      Heart attack Other          Allergies  Penicillins    Review of Systems   Constitutional:  Negative for chills and fever.   HENT:  Negative for trouble swallowing.    Respiratory:  Negative for shortness of breath.    Cardiovascular:  Negative for chest pain.   Gastrointestinal:  Negative for abdominal pain, diarrhea, nausea and vomiting.   Genitourinary:  Negative for difficulty urinating, dysuria and hematuria.   Musculoskeletal:  Negative for  "arthralgias and joint swelling.   Skin:  Negative for color change.   Neurological:  Negative for dizziness, light-headedness and numbness.   Psychiatric/Behavioral:  Negative for agitation and behavioral problems.         Physical Exam  Vitals reviewed.   Constitutional:       General: He is not in acute distress.     Appearance: Normal appearance.   HENT:      Head: Normocephalic and atraumatic.   Cardiovascular:      Rate and Rhythm: Normal rate and regular rhythm.   Pulmonary:      Effort: Pulmonary effort is normal. No respiratory distress.   Abdominal:      General: Abdomen is flat.   Musculoskeletal:         General: No swelling, tenderness or deformity.   Skin:     General: Skin is warm and dry.   Neurological:      General: No focal deficit present.      Mental Status: He is alert and oriented to person, place, and time.   Psychiatric:         Mood and Affect: Mood normal.         Behavior: Behavior normal.         Thought Content: Thought content normal.          Last Recorded Vitals  Blood pressure (!) 185/83, pulse 63, temperature 36.3 °C (97.3 °F), temperature source Temporal, resp. rate 18, height 1.702 m (5' 7\"), weight 79.4 kg (175 lb 0.7 oz), SpO2 97%.    Relevant Results  N/A     Assessment/Plan   Assessment & Plan  Polyp of colon, unspecified part of colon, unspecified type    Screening for colon cancer      Plan for screening colonoscopy with Dr. Bowling.       I spent 5 minutes in the professional and overall care of this patient.      Edwige Winters MD    "

## 2024-11-07 NOTE — LETTER
[phone]        Dear Mr. Melo,       It was my pleasure to see you again at your recent colonoscopy.  At that time,  you were noted to have 4 polyps in the cecum and sigmoid colon.  The polyps were completely excised and pathology returned the adenomatous type.  Which are a precancerous type of polyp if not removed, but yours were completely resected.  The current recommendation is to repeat the colonoscopy in 3 years.       Thank you very much for allowing me to take part in your care, please feel free to contact me with any questions or concerns at [phone].          Sincerely,       Ami Bowling M.D. GAB, FASCRS    CC:  Primary Care:

## 2024-11-07 NOTE — DISCHARGE INSTRUCTIONS
Patient Instructions after a Colonoscopy      The anesthetics, sedatives or narcotics which were given to you today will be acting in your body for the next 24 hours, so you might feel a little sleepy or groggy.  This feeling should slowly wear off. Carefully read and follow the instructions.     You received sedation today:  - Do not drive or operate any machinery or power tools of any kind.   - No alcoholic beverages today, not even beer or wine.  - Do not make any important decisions or sign any legal documents.  - No over the counter medications that contain alcohol or that may cause drowsiness.  - Do not make any important decisions or sign any legal documents.  - Make sure you have someone with you for first 24 hours.    While it is common to experience mild to moderate abdominal distention, gas, or belching after your procedure, if any of these symptoms occur following discharge from the GI Lab or within one week of having your procedure, call the [facility] to be advised whether a visit to your nearest Urgent Care or Emergency Department is indicated.  Take this paper with you if you go.     - If you develop an allergic reaction to the medications that were given during your procedure such as difficulty breathing, rash, hives, severe nausea, vomiting or lightheadedness.  - If you experience chest pain, shortness of breath, severe abdominal pain, fevers and chills.  -If you develop signs and symptoms of bleeding such as blood in your spit, if your stools turn black, tarry, or bloody  - If you have not urinated within 8 hours following your procedure.  - If your IV site becomes painful, red, inflamed, or looks infected.    If you received a biopsy/polypectomy/sphincterotomy the following instructions apply below:    __ Do not use Aspirin containing products, non-steroidal medications or anti-coagulants for one week following your procedure. (Examples of these types of medications are: Advil,  Arthrotec, Aleve, Coumadin, Ecotrin, Heparin, Ibuprofen, Indocin, Motrin, Naprosyn, Nuprin, Plavix, Vioxx, and Voltarin, or their generic forms.  This list is not all-inclusive.  Check with your physician or pharmacist before resuming medications.)   __ Eat a soft diet today.  Avoid foods that are poorly digested for the next 24 hours.  These foods would include: nuts, beans, lettuce, red meats, and fried foods. Start with liquids and advance your diet as tolerated, gradually work up to eating solids.   __ Do not have a Barium Study or Enema for one week.    Your physician recommends the additional following instructions:    -You have a contact number available for emergencies. The signs and symptoms of potential delayed complications were discussed with you. You may return to normal activities tomorrow.  -Resume your previous diet.  -Continue your present medications.   -We are waiting for your pathology results.  -Your physician has recommended a repeat colonoscopy (date to be determined after pending pathology results are reviewed) for surveillance based on pathology results.  -The findings and recommendations have been discussed with you.  -The findings and recommendations were discussed with your family.  - Please see Medication Reconciliation Form for new medication/medications prescribed.       If you experience any problems or have any questions following discharge from the GI Lab, please call:        Nurse Signature                                                                        Date___________________                                                                            Patient/Responsible Party Signature                                        Date___________________

## 2024-11-07 NOTE — POST-PROCEDURE NOTE
1002- PHASE II- Patient to Endoscopy bay at this time in stable condition. Beside monitors applied to patient upon arrival. Report received from GI team at bedside     1024- Home going instructions went over with patient and patient's family member. Educated on when to follow up with provider. All questions answered and patient and patient's family member verified understanding verbally.    1032- Criteria met for patient to discharge from Phase II at this time. Dr. Marin cleared patient for discharge since patient's BP (135/67) is lower than 20% baseline (which was 185/83).     1034- IV removed at this time with IV catheter tip intact upon removal    1039- Patient transported to [facility] at this time in stable condition and with all belongings via wheelchair.

## 2024-11-07 NOTE — ANESTHESIA PREPROCEDURE EVALUATION
Patient: Jose Melo Jr.    Procedure Information       Date/Time: 11/07/24 0850    Scheduled providers: Ami Bowling MD; Tyrone Marin MD; ZINA Mills-ISABEL; Bronson Duran RN    Procedure: COLONOSCOPY    Location: [facility]            Relevant Problems   Cardiac   (+) Hyperlipidemia   (+) Hypertension   (+) PAD (peripheral artery disease) (CMS-HCC)      GI   (+) Esophageal reflux      /Renal   (+) BPH without obstruction/lower urinary tract symptoms      Endocrine   (+) Obesity       Clinical information reviewed:   Tobacco  Allergies  Meds   Med Hx  Surg Hx   Fam Hx  Soc Hx        NPO Detail:  NPO/Void Status  Carbohydrate Drink Given Prior to Surgery? : N  Date of Last Liquid: 11/07/24  Time of Last Liquid: 0400  Date of Last Solid: 11/06/24  Time of Last Solid: 0800  Last Intake Type: GI prep  Time of Last Void: 0700         Physical Exam    Airway  Mallampati: II  TM distance: >3 FB  Neck ROM: full     Cardiovascular    Dental    Pulmonary    Abdominal        Anesthesia Plan    History of general anesthesia?: yes  History of complications of general anesthesia?: no    ASA 3     MAC   (Mild aortic stenosis, 2 coronary stents, stable, normal EF)  intravenous induction   Anesthetic plan and risks discussed with patient and spouse.    Plan discussed with CRNA.

## 2024-11-07 NOTE — ANESTHESIA POSTPROCEDURE EVALUATION
Patient: Jose Melo Jr.    Procedure Summary       Date: 11/07/24 Room / Location: [facility]    Anesthesia Start: 0925 Anesthesia Stop: 1004    Procedure: COLONOSCOPY Diagnosis:       Polyp of colon, unspecified part of colon, unspecified type      Screening for colon cancer    Scheduled Providers: Ami Bowling MD; Tyrone Marin MD; ZINA Mills-ISABEL; Bronson Duran RN; Bianka Campbell RN Responsible Provider: Tyrone Marin MD    Anesthesia Type: MAC ASA Status: 3            Anesthesia Type: MAC    Vitals Value Taken Time   /70 11/07/24 1005   Temp 36 11/07/24 1005   Pulse 62 11/07/24 1005   Resp 20 11/07/24 1005   SpO2 99 11/07/24 1005       Anesthesia Post Evaluation    Patient location during evaluation: PACU  Patient participation: complete - patient participated  Level of consciousness: awake  Pain management: adequate  Airway patency: patent  Cardiovascular status: acceptable and stable  Respiratory status: acceptable and nasal cannula  Hydration status: acceptable  Postoperative Nausea and Vomiting: none    No notable events documented.

## 2024-11-08 ASSESSMENT — PAIN SCALES - GENERAL: PAINLEVEL_OUTOF10: 0 - NO PAIN

## 2024-11-25 ENCOUNTER — TELEPHONE (OUTPATIENT)
Dept: CARDIOLOGY | Facility: HOSPITAL | Age: 79
End: 2024-11-25
Payer: MEDICARE

## 2024-11-25 DIAGNOSIS — N40.0 BPH WITHOUT OBSTRUCTION/LOWER URINARY TRACT SYMPTOMS: ICD-10-CM

## 2024-11-25 DIAGNOSIS — N40.0 BPH WITHOUT OBSTRUCTION/LOWER URINARY TRACT SYMPTOMS: Primary | ICD-10-CM

## 2024-11-25 RX ORDER — TAMSULOSIN HYDROCHLORIDE 0.4 MG/1
0.8 CAPSULE ORAL DAILY
Qty: 180 CAPSULE | Refills: 3 | Status: SHIPPED | OUTPATIENT
Start: 2024-11-25 | End: 2024-11-25 | Stop reason: SDUPTHER

## 2024-11-25 RX ORDER — TAMSULOSIN HYDROCHLORIDE 0.4 MG/1
0.8 CAPSULE ORAL DAILY
COMMUNITY
End: 2024-11-25 | Stop reason: SDUPTHER

## 2024-11-25 RX ORDER — TAMSULOSIN HYDROCHLORIDE 0.4 MG/1
0.8 CAPSULE ORAL DAILY
Qty: 180 CAPSULE | Refills: 3 | Status: SHIPPED | OUTPATIENT
Start: 2024-11-25

## 2025-01-23 ENCOUNTER — TELEPHONE (OUTPATIENT)
Dept: CARDIOLOGY | Facility: HOSPITAL | Age: 80
End: 2025-01-23
Payer: MEDICARE

## 2025-01-24 DIAGNOSIS — R07.89 CHEST TIGHTNESS: ICD-10-CM

## 2025-01-24 DIAGNOSIS — I25.10 CAD S/P PERCUTANEOUS CORONARY ANGIOPLASTY: Primary | ICD-10-CM

## 2025-01-24 DIAGNOSIS — Z98.61 CAD S/P PERCUTANEOUS CORONARY ANGIOPLASTY: Primary | ICD-10-CM

## 2025-01-24 DIAGNOSIS — R06.09 DOE (DYSPNEA ON EXERTION): ICD-10-CM

## 2025-01-24 RX ORDER — NITROGLYCERIN 0.4 MG/1
0.4 TABLET SUBLINGUAL EVERY 5 MIN PRN
Qty: 25 TABLET | Refills: 3 | Status: SHIPPED | OUTPATIENT
Start: 2025-01-24

## 2025-01-24 RX ORDER — ALBUTEROL SULFATE 90 UG/1
1 INHALANT RESPIRATORY (INHALATION) ONCE
OUTPATIENT
Start: 2025-01-24

## 2025-01-24 RX ORDER — ALBUTEROL SULFATE 0.83 MG/ML
3 SOLUTION RESPIRATORY (INHALATION) ONCE
OUTPATIENT
Start: 2025-01-24 | End: 2025-01-24

## 2025-01-27 ENCOUNTER — HOSPITAL ENCOUNTER (OUTPATIENT)
Dept: RESPIRATORY THERAPY | Facility: CLINIC | Age: 80
Discharge: HOME | End: 2025-01-27
Payer: MEDICARE

## 2025-01-27 DIAGNOSIS — R06.09 DOE (DYSPNEA ON EXERTION): ICD-10-CM

## 2025-01-27 DIAGNOSIS — R07.89 CHEST TIGHTNESS: ICD-10-CM

## 2025-01-27 PROCEDURE — 94727 GAS DIL/WSHOT DETER LNG VOL: CPT

## 2025-01-27 PROCEDURE — 94729 DIFFUSING CAPACITY: CPT

## 2025-01-27 PROCEDURE — 94010 BREATHING CAPACITY TEST: CPT

## 2025-01-30 ENCOUNTER — HOSPITAL ENCOUNTER (OUTPATIENT)
Dept: CARDIOLOGY | Facility: HOSPITAL | Age: 80
Discharge: HOME | End: 2025-01-30
Payer: MEDICARE

## 2025-01-30 DIAGNOSIS — R07.89 CHEST TIGHTNESS: ICD-10-CM

## 2025-01-30 DIAGNOSIS — I25.10 CAD S/P PERCUTANEOUS CORONARY ANGIOPLASTY: ICD-10-CM

## 2025-01-30 DIAGNOSIS — Z98.61 CAD S/P PERCUTANEOUS CORONARY ANGIOPLASTY: ICD-10-CM

## 2025-01-30 PROCEDURE — 93017 CV STRESS TEST TRACING ONLY: CPT

## 2025-01-30 PROCEDURE — 93018 CV STRESS TEST I&R ONLY: CPT | Performed by: INTERNAL MEDICINE

## 2025-01-30 PROCEDURE — 93016 CV STRESS TEST SUPVJ ONLY: CPT | Performed by: INTERNAL MEDICINE

## 2025-01-30 PROCEDURE — 93350 STRESS TTE ONLY: CPT | Performed by: INTERNAL MEDICINE

## 2025-02-06 NOTE — TELEPHONE ENCOUNTER
I called and spoke to patient.  He had some questions about the results of his stress echocardiogram.  I reviewed with him the impressions on his stress test at length and was able to answer most of his questions.   He asked if you felt that a Lexiscan stress test would be indicated since the stress echo had some limitations and this was noted in the result.  Patient at this time denies any complaints of chest pain.  Please advise.

## 2025-02-06 NOTE — TELEPHONE ENCOUNTER
Called and spoke to patient and notified him the Dr. RAKESH Iglesias does not feel that he needs a Lexiscan Stress test at this time as long as he remains free of pain.  I did tell patient that he should contact Dr. Iglesias if he experiences any cardiac symptoms in the future.  Patient verbalized understanding.

## 2025-02-18 ENCOUNTER — TELEPHONE (OUTPATIENT)
Dept: CARDIOLOGY | Facility: HOSPITAL | Age: 80
End: 2025-02-18

## 2025-02-18 DIAGNOSIS — N40.0 BPH WITHOUT OBSTRUCTION/LOWER URINARY TRACT SYMPTOMS: ICD-10-CM

## 2025-02-19 DIAGNOSIS — I10 HYPERTENSION, UNSPECIFIED TYPE: ICD-10-CM

## 2025-02-19 RX ORDER — LISINOPRIL AND HYDROCHLOROTHIAZIDE 12.5; 2 MG/1; MG/1
1 TABLET ORAL 2 TIMES DAILY
Qty: 180 TABLET | Refills: 1 | Status: SHIPPED | OUTPATIENT
Start: 2025-02-19 | End: 2026-02-19

## 2025-02-19 NOTE — TELEPHONE ENCOUNTER
I called and spoke to patient.  He reports that his blood pressures have been running very high.  This morning his blood pressure was 195/105 after he took his medications.  He has been compliant with taking Atenolol 50 mg daily and Lisinopril-hydrochlorothiazide 20/12.5 mg. He stated that he did take an additional lisinopril/hydrochlorothiazide yesterday without significant improvement in reading.  He said most BP readings are 170-180/80-90 range with lowest SBP of 165.  Patient denies any symptoms of pain, dizziness, or headaches.  He also denies any lifestyle changes to account for hypertensive episodes.  Please advise.

## 2025-02-19 NOTE — TELEPHONE ENCOUNTER
Spoke to patient and reviewed instruction per Dr. RAKESH Iglesias to increase lisinopril/hydrochlorothiazide to twice daily.  Patient advised to take blood pressure two times daily as well. Will need to follow up with patient on Friday, 2/21 for update on blood pressure response.  Patient verbalized understanding of instructions.

## 2025-02-24 DIAGNOSIS — I10 HYPERTENSION, UNSPECIFIED TYPE: Primary | ICD-10-CM

## 2025-02-24 RX ORDER — AMLODIPINE BESYLATE 5 MG/1
5 TABLET ORAL DAILY
Qty: 90 TABLET | Refills: 3 | Status: SHIPPED | OUTPATIENT
Start: 2025-02-24 | End: 2025-02-24 | Stop reason: WASHOUT

## 2025-02-24 RX ORDER — LISINOPRIL AND HYDROCHLOROTHIAZIDE 12.5; 2 MG/1; MG/1
1 TABLET ORAL 2 TIMES DAILY
Qty: 180 TABLET | Refills: 1 | Status: SHIPPED | OUTPATIENT
Start: 2025-02-24 | End: 2026-02-24

## 2025-02-24 RX ORDER — TAMSULOSIN HYDROCHLORIDE 0.4 MG/1
0.8 CAPSULE ORAL DAILY
Qty: 180 CAPSULE | Refills: 3 | Status: SHIPPED | OUTPATIENT
Start: 2025-02-24

## 2025-02-24 NOTE — TELEPHONE ENCOUNTER
Phoned patient to follow up with Blood pressure response from increase medication.  Left voicemail with contact number for nursing office [phone] and requested patient return call.

## 2025-02-24 NOTE — TELEPHONE ENCOUNTER
Patient going to take lisinopril hydrochlorothiazide 2x daily and hold second dose if evening blood pressure low per Dr. Iglesias. Patient and wife verbalize understanding.